# Patient Record
Sex: MALE | Race: WHITE | Employment: FULL TIME
[De-identification: names, ages, dates, MRNs, and addresses within clinical notes are randomized per-mention and may not be internally consistent; named-entity substitution may affect disease eponyms.]

---

## 2019-10-02 ENCOUNTER — HEALTH MAINTENANCE LETTER (OUTPATIENT)
Age: 54
End: 2019-10-02

## 2021-01-15 ENCOUNTER — HEALTH MAINTENANCE LETTER (OUTPATIENT)
Age: 56
End: 2021-01-15

## 2021-09-04 ENCOUNTER — HEALTH MAINTENANCE LETTER (OUTPATIENT)
Age: 56
End: 2021-09-04

## 2022-02-19 ENCOUNTER — HEALTH MAINTENANCE LETTER (OUTPATIENT)
Age: 57
End: 2022-02-19

## 2022-04-09 ENCOUNTER — APPOINTMENT (OUTPATIENT)
Dept: GENERAL RADIOLOGY | Facility: CLINIC | Age: 57
End: 2022-04-09
Attending: EMERGENCY MEDICINE
Payer: COMMERCIAL

## 2022-04-09 ENCOUNTER — HOSPITAL ENCOUNTER (EMERGENCY)
Facility: CLINIC | Age: 57
Discharge: HOME | End: 2022-04-09
Attending: EMERGENCY MEDICINE | Admitting: EMERGENCY MEDICINE
Payer: COMMERCIAL

## 2022-04-09 VITALS
SYSTOLIC BLOOD PRESSURE: 131 MMHG | BODY MASS INDEX: 23.86 KG/M2 | TEMPERATURE: 98.7 F | RESPIRATION RATE: 16 BRPM | DIASTOLIC BLOOD PRESSURE: 62 MMHG | WEIGHT: 180 LBS | OXYGEN SATURATION: 98 % | HEART RATE: 72 BPM | HEIGHT: 73 IN

## 2022-04-09 DIAGNOSIS — S46.912A STRAIN OF ACROMIOCLAVICULAR JOINT, LEFT, INITIAL ENCOUNTER: ICD-10-CM

## 2022-04-09 PROCEDURE — 73030 X-RAY EXAM OF SHOULDER: CPT | Mod: LT

## 2022-04-09 PROCEDURE — 99283 EMERGENCY DEPT VISIT LOW MDM: CPT

## 2022-04-09 PROCEDURE — 250N000013 HC RX MED GY IP 250 OP 250 PS 637: Performed by: EMERGENCY MEDICINE

## 2022-04-09 RX ORDER — IBUPROFEN 600 MG/1
600 TABLET, FILM COATED ORAL
Status: COMPLETED | OUTPATIENT
Start: 2022-04-09 | End: 2022-04-09

## 2022-04-09 RX ADMIN — IBUPROFEN 600 MG: 600 TABLET ORAL at 22:15

## 2022-04-09 ASSESSMENT — ENCOUNTER SYMPTOMS: ARTHRALGIAS: 1

## 2022-04-10 NOTE — ED TRIAGE NOTES
pt slipped on bottom stair, hitting left shoulder. denies hitting head or loc. denies bloodthinners.

## 2022-04-10 NOTE — ED PROVIDER NOTES
"  History   Chief Complaint:  Fall (pt slipped on bottom stair, hitting left shoulder. denies hitting head or loc. denies bloodthinners. ) and Shoulder Pain       HPI   Navin Hampton is a 57 year old male who presents with a left shoulder injury following a fall. He reports slipping off the bottom step and rolling onto his left shoulder. He denies hitting his head and loss of conciousness.  Pain is tolerable currently.     Review of Systems   Musculoskeletal: Positive for arthralgias.   Neurological: Negative for syncope.   All other systems reviewed and are negative.    Allergies:  Sulfa Drugs    Medications:  mucinex  Robitussin  Claritin    Past Medical History:     Allergic rhinitis      Past Surgical History:    Deviated septum repair  Appendectomy       Family History:    Sister: breast cancer  Father: prostate cancer    Social History:  Patient is accompanied     Physical Exam     Patient Vitals for the past 24 hrs:   BP Temp Temp src Pulse Resp SpO2 Height Weight   04/09/22 2118 131/62 -- -- -- -- -- -- --   04/09/22 2116 -- 98.7  F (37.1  C) Temporal 72 16 98 % 1.854 m (6' 1\") 81.6 kg (180 lb)       Physical Exam    Gen: Resting comfortably   Eyes: Clear conjunctiva, no discharge  Ears: External ears normal without swelling or drainage  Mouth: Mucous membranes moist  CV: regular rate  Resp: speaking in full sentences without any resp distress  Skin: warm dry well perfused  Musculoskeletal: left anterior shoulder tenderness without deformity   Neuro: Alert, no gross motor or sensory deficits,   Psych: pleasant, affect appropriate       Emergency Department Course     Imaging:  XR Shoulder Left G/E 3 Views   Final Result   IMPRESSION: Findings of an AC joint separation with moderate superior subluxation of the distal clavicle relative to the anterior acromion. No fracture.        Report per radiology    Emergency Department Course:  Reviewed:  I reviewed nursing notes, vitals, past medical history and Care " Everywhere    Assessments:  2130 I obtained history and examined the patient as noted above.     Disposition:  The patient was discharged to home.     Impression & Plan   Medical Decision Making:  Patient presents after fall impacting left shoulder.  Tender over the AC joint.  X-ray suggest AC joint ligamentous injury.  This is consistent with injury pattern area of pain.  Will recommend a shoulder sling NSAIDs and Tylenol.  Ice at home.  Will provide an excuse for physical therapy as well.  Discussed managing this soft tissue injury at home and patient and spouse are comfortable.  They can do follow-up with PCP as needed if they prefer.  Discussed how this injury should improve on its own but if pain is persistent routine follow-up with PCP would be a good idea.      Diagnosis:    ICD-10-CM    1. Strain of acromioclavicular joint, left, initial encounter  S46.912A Physical Therapy Referral     Scribe Disclosure:  I, Gio Potter, am serving as a scribe at 9:29 PM on 4/9/2022 to document services personally performed by Williams Humphrey, based on my observations and the provider's statements to me.          Williams Humphrey MD  04/09/22 0925

## 2022-10-22 ENCOUNTER — HEALTH MAINTENANCE LETTER (OUTPATIENT)
Age: 57
End: 2022-10-22

## 2023-04-01 ENCOUNTER — HEALTH MAINTENANCE LETTER (OUTPATIENT)
Age: 58
End: 2023-04-01

## 2024-06-02 ENCOUNTER — HEALTH MAINTENANCE LETTER (OUTPATIENT)
Age: 59
End: 2024-06-02